# Patient Record
Sex: FEMALE
[De-identification: names, ages, dates, MRNs, and addresses within clinical notes are randomized per-mention and may not be internally consistent; named-entity substitution may affect disease eponyms.]

---

## 2020-08-04 ENCOUNTER — HOSPITAL ENCOUNTER (OUTPATIENT)
Dept: HOSPITAL 56 - MW.SDS | Age: 50
Discharge: HOME | End: 2020-08-04
Attending: SURGERY
Payer: COMMERCIAL

## 2020-08-04 DIAGNOSIS — Z79.899: ICD-10-CM

## 2020-08-04 DIAGNOSIS — D12.5: ICD-10-CM

## 2020-08-04 DIAGNOSIS — Z12.11: Primary | ICD-10-CM

## 2020-08-04 PROCEDURE — 45385 COLONOSCOPY W/LESION REMOVAL: CPT

## 2020-08-04 PROCEDURE — 45381 COLONOSCOPY SUBMUCOUS NJX: CPT

## 2020-08-04 PROCEDURE — 81025 URINE PREGNANCY TEST: CPT

## 2020-08-04 NOTE — [id number]
Post Anesthesia Note





- EVALUATION WITHIN 48HRS OF ANESTHETIC


Vital Signs in Normal Range: Yes


Patient Participated in Evaluation: Yes


Respiratory Function Stable: Yes


Airway Patent: Yes


Cardiovascular Function Stable: Yes


Hydration Status Stable: Yes


Pain Control Satisfactory: Yes


Nausea and Vomiting Control Satisfactory: Yes


Mental Status Recovered: Yes


Vital Signs: 


                                Last Vital Signs











Temp  36.0 C L  08/04/20 11:27


 


Pulse  65   08/04/20 11:39


 


Resp  10 L  08/04/20 11:39


 


BP  140/69   08/04/20 11:39


 


Pulse Ox  100   08/04/20 11:39

## 2020-08-04 NOTE — OR
SURGEON:

DANA ALVARADO MD

 

DATE OF PROCEDURE:  08/04/2020

 

PREOPERATIVE DIAGNOSIS:

Screening colonoscopy.

 

POSTOPERATIVE DIAGNOSIS:

Sigmoid colon polyp.

 

PROCEDURE PERFORMED:

Screening colonoscopy with polypectomy.

 

PRIMARY SURGEON:

Dana Alvarado MD

 

ANESTHESIA:

MAC.

 

INSTRUMENT USED:

Olympus colonoscope

 

EXTENT OF EXAM:

To the cecum.

 

PREPARATION:

Good.

 

LIMITATIONS:

None.

 

INDICATIONS FOR EXAMINATION:

The patient is a 50-year-old female who presents for screening colonoscopy.  The

patient and I discussed the procedure, expected perioperative course, and the

risks.  She verbalized understanding and wishes to proceed.

 

PROCEDURE IN DETAIL:

The patient was brought into the endoscopy suite and placed in a left lateral

decubitus position.  A time-out was completed verifying the patient's name, age,

date of birth, allergies, and procedure to be performed.  Monitored anesthesia

care was induced and continuous oxygen was provided via nasal cannula throughout

the procedure.  After adequate sedation was achieved, a digital rectal exam was

performed.  This exam was within normal limits.  A well-lubricated colonoscope

was inserted in the rectum and advanced under direct visualization.  At 20 cm,

the patient had an obstructing pedunculated polyp.  In order to continue to pass

my scope safely through, I had to resect this before I had to reach the cecum.

A hot snare was used to transect the pedunculated polyp just below the base of

the stalk.  The polyp itself was suctioned and brought out through the rectum.

It was sent to pathology, labeled as sigmoid colon polyp.  It was approximately

2 cm in size.  The scope was placed back into the rectum and advanced to the 20

cm fay.  The remaining polyp stalk appeared hemostatic.  I then continued to

advance the scope without difficulty to the cecum.  The cecum was identified by

both visual and anatomic landmarks.  A photograph was taken of the cecal cap as

well as with the scope retroflexed within the cecum.  The scope was then fully

withdrawn while examining the color, texture, anatomy, and integrity of the

mucosa from the cecum to the anal canal.  No further lesions were noted.  Once I

reached 20 cm, I injected 0.5 mL of carbon based ink underneath the mucosa for

identification of this area in the future.  The scope was brought into the

rectum and retroflexed to allow visualization of the anal canal opening.  This

appeared normal and a photograph was taken.  The scope was straightened out and

fully withdrawn.  The cecum to anus time was 10 minutes.  The patient tolerated

the procedure well and was transferred to the PACU in stable condition.

 

ENDOSCOPIC DIAGNOSIS:

Sigmoid colon polyp.

 

RECOMMENDATIONS:

Follow up in clinic in 2 weeks.

 

 

LIDIA CERON

DD:  08/04/2020 13:23:54

DT:  08/04/2020 17:19:29

Job #:  [id number]

## 2020-08-04 NOTE — PCM.POSTAN
POST ANESTHESIA ASSESSMENT





- MENTAL STATUS


Mental Status: Alert, Oriented





- VITAL SIGNS


Vital Signs: 


                                Last Vital Signs











Temp  36.0 C L  08/04/20 11:27


 


Pulse  65   08/04/20 11:39


 


Resp  10 L  08/04/20 11:39


 


BP  140/69   08/04/20 11:39


 


Pulse Ox  100   08/04/20 11:39














- RESPIRATORY


Respiratory Status: Respiratory Rate WNL, Airway Patent, O2 Saturation Stable





- CARDIOVASCULAR


CV Status: Pulse Rate WNL, Blood Pressure Stable





- GASTROINTESTINAL


GI Status: No Symptoms





- POST OP HYDRATION


Hydration Status: Adequate & Stable

## 2020-08-04 NOTE — PCM.PREANE
Preanesthetic Assessment





- Anesthesia/Transfusion/Family Hx


Anesthesia History: Prior Anesthesia Without Reaction


Other Type of Anesthesia Reaction Comment: some nausea post-op


Family History of Anesthesia Reaction: No


Transfusion History: No Prior Transfusion(s)





- Review of Systems


General: No Symptoms


Pulmonary: No Symptoms


Cardiovascular: No Symptoms


Gastrointestinal: No Symptoms


Neurological: No Symptoms


Other: Reports: None





- Physical Assessment


NPO Status Date: 08/03/20


Height: 5 ft 8 in


Weight: 69.4 kg


Mental Status: Alert & Oriented x3


Airway Class: Mallampati = 2


Dentition: Reports: Normal Dentition


ROM/Head Extension: Full


Lungs: Clear to Auscultation, Normal Respiratory Effort


Cardiovascular: Regular Rate, Regular Rhythm





- Lab


Values: 





                             Laboratory Last Values











Urine HCG, Qual  NEGATIVE  (NEGATIVE)   08/04/20  10:15    














- Allergies


Allergies/Adverse Reactions: 


                                    Allergies











Allergy/AdvReac Type Severity Reaction Status Date / Time


 


No Known Allergies Allergy   Verified 07/30/20 11:28














- Blood


Blood Available: No





- Anesthesia Plan


Pre-Op Medication Ordered: None





- Acknowledgements


Anesthesia Type Planned: General Anesthesia (tiva)


Pt an Appropriate Candidate for the Planned Anesthesia: Yes


Alternatives and Risks of Anesthesia Discussed w Pt/Guardian: Yes


Pt/Guardian Understands and Agrees with Anesthesia Plan: Yes





PreAnesthesia Questionnaire


HEENT History: Reports: Other (See Below)


Other HEENT History: wears glasses


Cardiovascular History: Reports: Other (See Below)


Other Cardiovascular History: occasionally feels an "extra beat"


OB/GYN History: Reports: Pregnancy


Neurological History: Reports: Migraines, Other (See Below)


Other Neuro History: has Occular migraines,  hx of motion sickness





- Past Surgical History


Head Surgeries/Procedures: Reports: None


HEENT Surgical History: Reports: Tonsillectomy


Other HEENT Surgeries/Procedures: hx of Myringotomy- no tubes


Female  Surgical History: Reports: Breast Biopsy





- SUBSTANCE USE


Smoking Status *Q: Never Smoker


Recreational Drug Use History: No





- HOME MEDS


Home Medications: 


                                    Home Meds





Calcium Carbonate [Calcium] 600 mg PO DAILY 07/29/20 [History]


Pnv,Calcium 72/Iron/Folic Acid [Prenatal Plus Tablet] 1 tab PO DAILY 07/30/20 

[History]











- CURRENT (IN HOUSE) MEDS


Current Meds: 





                               Current Medications





Lactated Ringer's (Ringers, Lactated)  1,000 mls @ 125 mls/hr IV ASDIRECTED ROWENA


   Last Admin: 08/04/20 10:42 Dose:  125 mls/hr


   Documented by: 


Sodium Chloride (Saline Flush)  10 ml FLUSH ASDIRECTED PRN


   PRN Reason: Keep Vein Open


Sodium Chloride (Saline Flush)  2.5 ml FLUSH ASDIRECTED PRN


   PRN Reason: Keep Vein Open


Sodium Chloride (Saline Flush)  10 ml FLUSH ASDIRECTED PRN


   PRN Reason: Keep Vein Open


Sodium Chloride (Saline Flush)  2.5 ml FLUSH ASDIRECTED PRN


   PRN Reason: Keep Vein Open


Sodium Chloride (Normal Saline)  10 ml IV ASDIRECTED PRN


   PRN Reason: IV Use





Discontinued Medications





Fentanyl (Sublimaze) Confirm Administered Dose 100 mcg .ROUTE .STK-MED ONE


   Stop: 08/04/20 09:02


Lidocaine (Xylocaine-Mpf 2%) Confirm Administered Dose 5 ml .ROUTE .STK-MED ONE


   Stop: 08/04/20 09:03


Midazolam HCl (Versed 1 Mg/Ml) Confirm Administered Dose 2 mg .ROUTE .STK-MED 

ONE


   Stop: 08/04/20 09:02


Propofol (Diprivan  20 Ml) Confirm Administered Dose 200 mg .ROUTE .STK-MED ONE


   Stop: 08/04/20 09:02


Propofol (Diprivan  20 Ml) Confirm Administered Dose 200 mg .ROUTE .STK-MED ONE


   Stop: 08/04/20 09:02

## 2020-08-04 NOTE — PCM.OPNOTE
- General Post-Op/Procedure Note


Date of Surgery/Procedure: 08/04/20


Operative Procedure(s): Screening colonoscopy with polypectomy


Findings: 





Large sigmoid colon polyp @ 20cm


Pre Op Diagnosis: Screening colonoscopy


Post-Op Diagnosis: Sigmoid colon polyp


Anesthesia Technique: MAC


Primary Surgeon: Dana Alvarado


Condition: Good


Free Text/Narrative:: 


                                 Intake & Output











 08/03/20 08/04/20 08/04/20





 22:59 06:59 14:59


 


Intake Total   800


 


Balance   800